# Patient Record
Sex: FEMALE | Race: BLACK OR AFRICAN AMERICAN | NOT HISPANIC OR LATINO | URBAN - METROPOLITAN AREA
[De-identification: names, ages, dates, MRNs, and addresses within clinical notes are randomized per-mention and may not be internally consistent; named-entity substitution may affect disease eponyms.]

---

## 2020-07-02 ENCOUNTER — TELEPHONE ENCOUNTER (OUTPATIENT)
Dept: URBAN - METROPOLITAN AREA CLINIC 9 | Facility: CLINIC | Age: 55
End: 2020-07-02

## 2020-07-02 ENCOUNTER — OFFICE VISIT (OUTPATIENT)
Age: 55
End: 2020-07-02

## 2020-07-07 ENCOUNTER — APPOINTMENT (RX ONLY)
Dept: URBAN - METROPOLITAN AREA CLINIC 37 | Facility: CLINIC | Age: 55
Setting detail: DERMATOLOGY
End: 2020-07-07

## 2020-07-07 ENCOUNTER — APPOINTMENT (RX ONLY)
Dept: URBAN - METROPOLITAN AREA CLINIC 38 | Facility: CLINIC | Age: 55
Setting detail: DERMATOLOGY
End: 2020-07-07

## 2020-07-07 DIAGNOSIS — D18.0 HEMANGIOMA: ICD-10-CM

## 2020-07-07 DIAGNOSIS — L81.4 OTHER MELANIN HYPERPIGMENTATION: ICD-10-CM

## 2020-07-07 DIAGNOSIS — L57.3 POIKILODERMA OF CIVATTE: ICD-10-CM

## 2020-07-07 DIAGNOSIS — D22 MELANOCYTIC NEVI: ICD-10-CM

## 2020-07-07 DIAGNOSIS — L82.1 OTHER SEBORRHEIC KERATOSIS: ICD-10-CM

## 2020-07-07 DIAGNOSIS — L91.8 OTHER HYPERTROPHIC DISORDERS OF THE SKIN: ICD-10-CM

## 2020-07-07 DIAGNOSIS — L72.0 EPIDERMAL CYST: ICD-10-CM

## 2020-07-07 PROBLEM — D23.71 OTHER BENIGN NEOPLASM OF SKIN OF RIGHT LOWER LIMB, INCLUDING HIP: Status: ACTIVE | Noted: 2020-07-07

## 2020-07-07 PROBLEM — D18.01 HEMANGIOMA OF SKIN AND SUBCUTANEOUS TISSUE: Status: ACTIVE | Noted: 2020-07-07

## 2020-07-07 PROBLEM — D23.61 OTHER BENIGN NEOPLASM OF SKIN OF RIGHT UPPER LIMB, INCLUDING SHOULDER: Status: ACTIVE | Noted: 2020-07-07

## 2020-07-07 PROBLEM — D22.39 MELANOCYTIC NEVI OF OTHER PARTS OF FACE: Status: ACTIVE | Noted: 2020-07-07

## 2020-07-07 PROBLEM — D23.5 OTHER BENIGN NEOPLASM OF SKIN OF TRUNK: Status: ACTIVE | Noted: 2020-07-07

## 2020-07-07 PROCEDURE — ? COUNSELING

## 2020-07-07 PROCEDURE — 99203 OFFICE O/P NEW LOW 30 MIN: CPT

## 2020-07-07 ASSESSMENT — LOCATION ZONE DERM
LOCATION ZONE: AXILLAE
LOCATION ZONE: TRUNK
LOCATION ZONE: FACE
LOCATION ZONE: NECK
LOCATION ZONE: FACE
LOCATION ZONE: AXILLAE
LOCATION ZONE: LEG
LOCATION ZONE: TRUNK
LOCATION ZONE: NECK
LOCATION ZONE: LEG

## 2020-07-07 ASSESSMENT — LOCATION SIMPLE DESCRIPTION DERM
LOCATION SIMPLE: RIGHT UPPER BACK
LOCATION SIMPLE: ABDOMEN
LOCATION SIMPLE: RIGHT AXILLARY VAULT
LOCATION SIMPLE: GROIN
LOCATION SIMPLE: LEFT AXILLARY VAULT
LOCATION SIMPLE: LEFT CHEEK
LOCATION SIMPLE: LEFT ANTERIOR NECK
LOCATION SIMPLE: GROIN
LOCATION SIMPLE: UPPER BACK
LOCATION SIMPLE: ABDOMEN
LOCATION SIMPLE: RIGHT ANTERIOR NECK
LOCATION SIMPLE: UPPER BACK
LOCATION SIMPLE: RIGHT THIGH
LOCATION SIMPLE: RIGHT ANTERIOR NECK
LOCATION SIMPLE: LEFT THIGH
LOCATION SIMPLE: LEFT AXILLARY VAULT
LOCATION SIMPLE: RIGHT THIGH
LOCATION SIMPLE: LEFT THIGH
LOCATION SIMPLE: RIGHT AXILLARY VAULT
LOCATION SIMPLE: RIGHT UPPER BACK
LOCATION SIMPLE: LEFT ANTERIOR NECK
LOCATION SIMPLE: LEFT CHEEK
LOCATION SIMPLE: RIGHT CHEEK
LOCATION SIMPLE: RIGHT CHEEK

## 2020-07-07 ASSESSMENT — LOCATION DETAILED DESCRIPTION DERM
LOCATION DETAILED: RIGHT LATERAL UPPER BACK
LOCATION DETAILED: LEFT INFERIOR LATERAL MALAR CHEEK
LOCATION DETAILED: LEFT RIB CAGE
LOCATION DETAILED: LEFT INFERIOR ANTERIOR NECK
LOCATION DETAILED: SUPERIOR THORACIC SPINE
LOCATION DETAILED: SUPERIOR THORACIC SPINE
LOCATION DETAILED: RIGHT RIB CAGE
LOCATION DETAILED: RIGHT LATERAL UPPER BACK
LOCATION DETAILED: LEFT INFERIOR LATERAL NECK
LOCATION DETAILED: LEFT ANTERIOR PROXIMAL THIGH
LOCATION DETAILED: RIGHT INFERIOR ANTERIOR NECK
LOCATION DETAILED: LEFT AXILLARY VAULT
LOCATION DETAILED: RIGHT SUPRAPUBIC SKIN
LOCATION DETAILED: RIGHT AXILLARY VAULT
LOCATION DETAILED: RIGHT SUPERIOR MEDIAL UPPER BACK
LOCATION DETAILED: RIGHT ANTERIOR PROXIMAL THIGH
LOCATION DETAILED: RIGHT INFERIOR CENTRAL MALAR CHEEK
LOCATION DETAILED: LEFT INFERIOR ANTERIOR NECK
LOCATION DETAILED: RIGHT SUPERIOR MEDIAL UPPER BACK
LOCATION DETAILED: LEFT ANTERIOR PROXIMAL THIGH
LOCATION DETAILED: RIGHT SUPRAPUBIC SKIN
LOCATION DETAILED: RIGHT AXILLARY VAULT
LOCATION DETAILED: RIGHT INFERIOR ANTERIOR NECK
LOCATION DETAILED: RIGHT INFERIOR LATERAL NECK
LOCATION DETAILED: RIGHT INFERIOR MEDIAL MALAR CHEEK
LOCATION DETAILED: RIGHT ANTERIOR PROXIMAL THIGH
LOCATION DETAILED: LEFT AXILLARY VAULT
LOCATION DETAILED: RIGHT INFERIOR MEDIAL MALAR CHEEK
LOCATION DETAILED: LEFT INFERIOR LATERAL NECK
LOCATION DETAILED: RIGHT INFERIOR CENTRAL MALAR CHEEK
LOCATION DETAILED: RIGHT INFERIOR LATERAL NECK
LOCATION DETAILED: LEFT RIB CAGE
LOCATION DETAILED: RIGHT RIB CAGE
LOCATION DETAILED: LEFT INFERIOR LATERAL MALAR CHEEK

## 2020-07-07 NOTE — PROCEDURE: COUNSELING
Detail Level: Simple
Detail Level: Zone
Detail Level: Detailed
Patient Specific Counseling (Will Not Stick From Patient To Patient): Requested that patient make another appointment for cosmetic removal of skin tags.
Patient Specific Counseling (Will Not Stick From Patient To Patient): Lesion on the right upper lateral back was treated with LN2 at n/c to the patient.
Detail Level: Generalized

## 2020-07-07 NOTE — HPI: EVALUATION OF SKIN LESION(S)
What Type Of Note Output Would You Prefer (Optional)?: Standard Output
How Severe Are Your Spot(S)?: mild
Have Your Spot(S) Been Treated In The Past?: has not been treated
Hpi Title: Evaluation of Skin Lesions
Additional History: Pt has a few skin tags under her axilla she is worried about.
Year Removed: 1900

## 2020-07-07 NOTE — PROCEDURE: COUNSELING
Detail Level: Generalized
Patient Specific Counseling (Will Not Stick From Patient To Patient): Lesion on the right upper lateral back was treated with LN2 at n/c to the patient.
Detail Level: Zone
Detail Level: Detailed
Patient Specific Counseling (Will Not Stick From Patient To Patient): Requested that patient make another appointment for cosmetic removal of skin tags.
Detail Level: Simple

## 2020-07-07 NOTE — HPI: EVALUATION OF SKIN LESION(S)
What Type Of Note Output Would You Prefer (Optional)?: Standard Output
Hpi Title: Evaluation of Skin Lesions
How Severe Are Your Spot(S)?: mild
Have Your Spot(S) Been Treated In The Past?: has not been treated
Year Removed: 1900
Additional History: Pt has a few skin tags under her axilla she is worried about.

## 2020-07-09 ENCOUNTER — OFFICE VISIT (OUTPATIENT)
Dept: URBAN - METROPOLITAN AREA CLINIC 9 | Facility: CLINIC | Age: 55
End: 2020-07-09

## 2020-07-30 ENCOUNTER — TELEPHONE ENCOUNTER (OUTPATIENT)
Dept: URBAN - METROPOLITAN AREA CLINIC 9 | Facility: CLINIC | Age: 55
End: 2020-07-30

## 2020-08-06 ENCOUNTER — APPOINTMENT (RX ONLY)
Dept: URBAN - METROPOLITAN AREA CLINIC 37 | Facility: CLINIC | Age: 55
Setting detail: DERMATOLOGY
End: 2020-08-06

## 2020-08-06 ENCOUNTER — APPOINTMENT (RX ONLY)
Dept: URBAN - METROPOLITAN AREA CLINIC 38 | Facility: CLINIC | Age: 55
Setting detail: DERMATOLOGY
End: 2020-08-06

## 2020-08-06 DIAGNOSIS — L91.8 OTHER HYPERTROPHIC DISORDERS OF THE SKIN: ICD-10-CM

## 2020-08-06 DIAGNOSIS — L82.1 OTHER SEBORRHEIC KERATOSIS: ICD-10-CM

## 2020-08-06 PROCEDURE — ? LIQUID NITROGEN (COSMETIC)

## 2020-08-06 PROCEDURE — ? SKIN TAG REMOVAL (COSMETIC)

## 2020-08-06 ASSESSMENT — LOCATION ZONE DERM
LOCATION ZONE: TRUNK
LOCATION ZONE: AXILLAE
LOCATION ZONE: NECK
LOCATION ZONE: NECK
LOCATION ZONE: TRUNK
LOCATION ZONE: AXILLAE

## 2020-08-06 ASSESSMENT — LOCATION SIMPLE DESCRIPTION DERM
LOCATION SIMPLE: RIGHT BREAST
LOCATION SIMPLE: ABDOMEN
LOCATION SIMPLE: LEFT AXILLARY VAULT
LOCATION SIMPLE: LEFT ANTERIOR NECK
LOCATION SIMPLE: LEFT ANTERIOR NECK
LOCATION SIMPLE: RIGHT BREAST
LOCATION SIMPLE: LEFT BREAST
LOCATION SIMPLE: RIGHT LOWER BACK
LOCATION SIMPLE: RIGHT AXILLARY VAULT
LOCATION SIMPLE: RIGHT ANTERIOR NECK
LOCATION SIMPLE: RIGHT LOWER BACK
LOCATION SIMPLE: LEFT BREAST
LOCATION SIMPLE: RIGHT ANTERIOR NECK
LOCATION SIMPLE: LEFT AXILLARY VAULT
LOCATION SIMPLE: ABDOMEN
LOCATION SIMPLE: RIGHT AXILLARY VAULT

## 2020-08-06 ASSESSMENT — LOCATION DETAILED DESCRIPTION DERM
LOCATION DETAILED: RIGHT AXILLARY VAULT
LOCATION DETAILED: RIGHT RIB CAGE
LOCATION DETAILED: RIGHT INFERIOR LATERAL MIDBACK
LOCATION DETAILED: RIGHT INFERIOR LATERAL NECK
LOCATION DETAILED: RIGHT RIB CAGE
LOCATION DETAILED: RIGHT CLAVICULAR NECK
LOCATION DETAILED: RIGHT AXILLARY VAULT
LOCATION DETAILED: LEFT INFERIOR LATERAL NECK
LOCATION DETAILED: LEFT CLAVICULAR NECK
LOCATION DETAILED: LEFT INFERIOR LATERAL NECK
LOCATION DETAILED: SUBXIPHOID
LOCATION DETAILED: LEFT AXILLARY VAULT
LOCATION DETAILED: LEFT INFRAMAMMARY CREASE (INNER QUADRANT)
LOCATION DETAILED: SUBXIPHOID
LOCATION DETAILED: RIGHT INFERIOR LATERAL MIDBACK
LOCATION DETAILED: LEFT AXILLARY VAULT
LOCATION DETAILED: LEFT CLAVICULAR NECK
LOCATION DETAILED: RIGHT CLAVICULAR NECK
LOCATION DETAILED: LEFT RIB CAGE
LOCATION DETAILED: RIGHT INFRAMAMMARY CREASE (INNER QUADRANT)
LOCATION DETAILED: RIGHT INFERIOR LATERAL NECK
LOCATION DETAILED: RIGHT INFRAMAMMARY CREASE (INNER QUADRANT)
LOCATION DETAILED: LEFT INFRAMAMMARY CREASE (INNER QUADRANT)
LOCATION DETAILED: LEFT RIB CAGE

## 2020-08-06 NOTE — PROCEDURE: LIQUID NITROGEN (COSMETIC)
Consent: The patient's consent was obtained including but not limited to risks of crusting, scabbing, blistering, scarring, darker or lighter pigmentary change, recurrence, incomplete removal and infection.
Render Post-Care Instructions In Note?: yes
Price (Use Numbers Only, No Special Characters Or $): 155
Post-Care Instructions: I reviewed with the patient in detail post-care instructions. Patient is to  avoid picking at any of the treated lesions. Pt may apply Vaseline to crusted or scabbing areas.
Billing Information: Bill by Static Price
Detail Level: Simple

## 2020-08-06 NOTE — PROCEDURE: LIQUID NITROGEN (COSMETIC)
Consent: The patient's consent was obtained including but not limited to risks of crusting, scabbing, blistering, scarring, darker or lighter pigmentary change, recurrence, incomplete removal and infection.
Render Post-Care Instructions In Note?: yes
Post-Care Instructions: I reviewed with the patient in detail post-care instructions. Patient is to  avoid picking at any of the treated lesions. Pt may apply Vaseline to crusted or scabbing areas.
Billing Information: Bill by Static Price
Detail Level: Simple
Price (Use Numbers Only, No Special Characters Or $): 155

## 2020-08-06 NOTE — PROCEDURE: SKIN TAG REMOVAL (COSMETIC)
Consent: Written consent obtained and the risks of skin tag removal was reviewed with the patient including but not limited to bleeding, pigmentary change, infection, pain, and remote possibility of scarring.
Removed With: scissors
Detail Level: Simple
Price (Use Numbers Only, No Special Characters Or $): 25
Anesthesia Volume In Cc: 5

## 2020-08-06 NOTE — PROCEDURE: SKIN TAG REMOVAL (COSMETIC)
Anesthesia Volume In Cc: 5
Detail Level: Simple
Removed With: scissors
Price (Use Numbers Only, No Special Characters Or $): 25
Consent: Written consent obtained and the risks of skin tag removal was reviewed with the patient including but not limited to bleeding, pigmentary change, infection, pain, and remote possibility of scarring.

## 2020-08-14 ENCOUNTER — OFFICE VISIT (OUTPATIENT)
Dept: URBAN - METROPOLITAN AREA SURGERY CENTER 9 | Facility: SURGERY CENTER | Age: 55
End: 2020-08-14

## 2020-09-01 ENCOUNTER — OFFICE VISIT (OUTPATIENT)
Age: 55
End: 2020-09-01

## 2020-09-02 ENCOUNTER — TELEPHONE ENCOUNTER (OUTPATIENT)
Dept: URBAN - METROPOLITAN AREA CLINIC 9 | Facility: CLINIC | Age: 55
End: 2020-09-02

## 2020-09-08 ENCOUNTER — OFFICE VISIT (OUTPATIENT)
Dept: URBAN - METROPOLITAN AREA SURGERY CENTER 9 | Facility: SURGERY CENTER | Age: 55
End: 2020-09-08

## 2020-10-29 ENCOUNTER — TELEPHONE ENCOUNTER (OUTPATIENT)
Dept: URBAN - METROPOLITAN AREA CLINIC 9 | Facility: CLINIC | Age: 55
End: 2020-10-29

## 2020-10-30 ENCOUNTER — OFFICE VISIT (OUTPATIENT)
Dept: URBAN - METROPOLITAN AREA SURGERY CENTER 9 | Facility: SURGERY CENTER | Age: 55
End: 2020-10-30

## 2020-11-12 ENCOUNTER — APPOINTMENT (RX ONLY)
Dept: URBAN - METROPOLITAN AREA CLINIC 38 | Facility: CLINIC | Age: 55
Setting detail: DERMATOLOGY
End: 2020-11-12

## 2020-11-12 ENCOUNTER — OFFICE VISIT (OUTPATIENT)
Dept: URBAN - METROPOLITAN AREA SURGERY CENTER 9 | Facility: SURGERY CENTER | Age: 55
End: 2020-11-12

## 2020-11-12 ENCOUNTER — APPOINTMENT (RX ONLY)
Dept: URBAN - METROPOLITAN AREA CLINIC 37 | Facility: CLINIC | Age: 55
Setting detail: DERMATOLOGY
End: 2020-11-12

## 2020-11-12 DIAGNOSIS — B07.8 OTHER VIRAL WARTS: ICD-10-CM

## 2020-11-12 PROBLEM — D48.5 NEOPLASM OF UNCERTAIN BEHAVIOR OF SKIN: Status: ACTIVE | Noted: 2020-11-12

## 2020-11-12 PROCEDURE — 99072 ADDL SUPL MATRL&STAF TM PHE: CPT

## 2020-11-12 PROCEDURE — ? PHE RELATED SUPPLIES PROVIDED/DISPENSED

## 2020-11-12 PROCEDURE — 11102 TANGNTL BX SKIN SINGLE LES: CPT

## 2020-11-12 PROCEDURE — ? BIOPSY BY SHAVE METHOD

## 2020-11-12 ASSESSMENT — LOCATION ZONE DERM
LOCATION ZONE: TRUNK
LOCATION ZONE: TRUNK

## 2020-11-12 ASSESSMENT — LOCATION DETAILED DESCRIPTION DERM
LOCATION DETAILED: RIGHT INFERIOR LATERAL UPPER BACK
LOCATION DETAILED: RIGHT INFERIOR LATERAL UPPER BACK

## 2020-11-12 ASSESSMENT — LOCATION SIMPLE DESCRIPTION DERM
LOCATION SIMPLE: RIGHT UPPER BACK
LOCATION SIMPLE: RIGHT UPPER BACK

## 2020-11-12 NOTE — HPI: EVALUATION OF SKIN LESION(S)
What Type Of Note Output Would You Prefer (Optional)?: Standard Output
How Severe Are Your Spot(S)?: moderate
Have Your Spot(S) Been Treated In The Past?: has been treated
Hpi Title: Evaluation of a Skin Lesion
Additional History: the area has been treated x 2 with cryotherapy and is still present.
Year Removed: 1900

## 2020-11-12 NOTE — PROCEDURE: MIPS QUALITY
Quality 110: Preventive Care And Screening: Influenza Immunization: Influenza Immunization not Administered for Documented Reasons.
Additional Notes: Pt declines influenza vaccination due to personal reasons
Detail Level: Detailed

## 2020-11-12 NOTE — PROCEDURE: BIOPSY BY SHAVE METHOD
Destruction After The Procedure: Yes
Electrodesiccation And Curettage Text: The wound bed was treated with electrodesiccation and curettage after the biopsy was performed.
Information: Selecting Yes will display possible errors in your note based on the variables you have selected. This validation is only offered as a suggestion for you. PLEASE NOTE THAT THE VALIDATION TEXT WILL BE REMOVED WHEN YOU FINALIZE YOUR NOTE. IF YOU WANT TO FAX A PRELIMINARY NOTE YOU WILL NEED TO TOGGLE THIS TO 'NO' IF YOU DO NOT WANT IT IN YOUR FAXED NOTE.
Hide Biopsy Depth?: No
Anesthesia Type: 1% lidocaine with epinephrine
Curettage Text: The wound bed was treated with curettage after the biopsy was performed.
Anesthesia Volume In Cc: 0.5
Billing Type: Third-Party Bill
Size Of Lesion In Cm: 0
Type Of Destruction Used: Curettage
Post-Care Instructions: I reviewed with the patient in detail post-care instructions. Patient is to keep the biopsy site dry overnight, and then apply vaseline twice daily until healed.
Biopsy Type: H and E
Consent: Written consent was obtained and risks were reviewed including but not limited to scarring, infection, bleeding, scabbing, incomplete removal,
Silver Nitrate Text: The wound bed was treated with silver nitrate after the biopsy was performed.
Biopsy Method: Brad ruff
Hemostasis: Electrocautery
Depth Of Biopsy: dermis
Cryotherapy Text: The wound bed was treated with cryotherapy after the biopsy was performed.
Dressing: bandage
Wound Care: Vaseline
Detail Level: Detailed
Electrodesiccation Text: The wound bed was treated with electrodesiccation after the biopsy was performed.
Notification Instructions: Patient will be notified of biopsy results. However, patient instructed to call the office if not contacted within 2 weeks.

## 2020-11-12 NOTE — HPI: EVALUATION OF SKIN LESION(S)
What Type Of Note Output Would You Prefer (Optional)?: Standard Output
Hpi Title: Evaluation of a Skin Lesion
How Severe Are Your Spot(S)?: moderate
Have Your Spot(S) Been Treated In The Past?: has been treated
Year Removed: 1900
Additional History: the area has been treated x 2 with cryotherapy and is still present.

## 2020-11-12 NOTE — PROCEDURE: BIOPSY BY SHAVE METHOD
Detail Level: Detailed
Depth Of Biopsy: dermis
Was A Bandage Applied: Yes
Size Of Lesion In Cm: 0
Biopsy Type: H and E
Biopsy Method: Jareth gutierrez
Anesthesia Type: 1% lidocaine with epinephrine
Anesthesia Volume In Cc: 0.5
Hemostasis: Electrocautery
Wound Care: Vaseline
Dressing: bandage
Type Of Destruction Used: Curettage
Curettage Text: The wound bed was treated with curettage after the biopsy was performed.
Cryotherapy Text: The wound bed was treated with cryotherapy after the biopsy was performed.
Electrodesiccation Text: The wound bed was treated with electrodesiccation after the biopsy was performed.
Electrodesiccation And Curettage Text: The wound bed was treated with electrodesiccation and curettage after the biopsy was performed.
Silver Nitrate Text: The wound bed was treated with silver nitrate after the biopsy was performed.
Lab: [id number]
Lab Facility: [id number]
Render Path Notes In Note?: No
Consent: Written consent was obtained and risks were reviewed including but not limited to scarring, infection, bleeding, scabbing, incomplete removal,
Post-Care Instructions: I reviewed with the patient in detail post-care instructions. Patient is to keep the biopsy site dry overnight, and then apply vaseline twice daily until healed.
Notification Instructions: Patient will be notified of biopsy results. However, patient instructed to call the office if not contacted within 2 weeks.
Billing Type: Third-Party Bill
Information: Selecting Yes will display possible errors in your note based on the variables you have selected. This validation is only offered as a suggestion for you. PLEASE NOTE THAT THE VALIDATION TEXT WILL BE REMOVED WHEN YOU FINALIZE YOUR NOTE. IF YOU WANT TO FAX A PRELIMINARY NOTE YOU WILL NEED TO TOGGLE THIS TO 'NO' IF YOU DO NOT WANT IT IN YOUR FAXED NOTE.

## 2022-07-09 ENCOUNTER — TELEPHONE ENCOUNTER (OUTPATIENT)
Dept: URBAN - METROPOLITAN AREA CLINIC 121 | Facility: CLINIC | Age: 57
End: 2022-07-09

## 2022-07-09 RX ORDER — FERROUS SULFATE 325(65) MG
TABLET ORAL TAKE AS DIRECTED
Refills: 0 | OUTPATIENT
Start: 2015-09-22 | End: 2015-09-22

## 2022-07-09 RX ORDER — ASPIRIN 325 MG/1
TABLET ORAL ONCE A DAY
Refills: 0 | OUTPATIENT
Start: 2010-02-02 | End: 2015-09-22

## 2022-07-09 RX ORDER — OMEGA-3S/DHA/EPA/FISH OIL 980-1400MG
CAPSULE,DELAYED RELEASE (ENTERIC COATED) ORAL ONCE A DAY
Refills: 0 | OUTPATIENT
Start: 2015-09-22 | End: 2015-09-22

## 2022-07-09 RX ORDER — TRAMADOL HYDROCHLORIDE 50 MG/1
TABLET ORAL TAKE AS DIRECTED
Refills: 0 | OUTPATIENT
Start: 2010-02-02 | End: 2015-09-22

## 2022-07-09 RX ORDER — OMEGA-3S/DHA/EPA/FISH OIL 980-1400MG
CAPSULE,DELAYED RELEASE (ENTERIC COATED) ORAL ONCE A DAY
Refills: 0 | OUTPATIENT
Start: 2010-02-02 | End: 2015-09-22

## 2022-07-09 RX ORDER — FERROUS SULFATE 325(65) MG
TABLET ORAL TAKE AS DIRECTED
Refills: 0 | OUTPATIENT
Start: 2010-02-02 | End: 2015-09-22

## 2022-07-10 ENCOUNTER — TELEPHONE ENCOUNTER (OUTPATIENT)
Dept: URBAN - METROPOLITAN AREA CLINIC 121 | Facility: CLINIC | Age: 57
End: 2022-07-10

## 2022-07-10 RX ORDER — OMEPRAZOLE 20 MG/1
ONCE A DAY CAPSULE, DELAYED RELEASE ORAL ONCE A DAY
Refills: 0 | Status: ACTIVE | COMMUNITY
Start: 2016-08-26

## 2022-07-10 RX ORDER — MELATONIN 10 MG
TABLET, SUBLINGUAL SUBLINGUAL ONCE A DAY
Refills: 0 | Status: ACTIVE | COMMUNITY
Start: 2015-09-22

## 2022-07-10 RX ORDER — IBUPROFEN 200 MG
TABLET ORAL
Refills: 0 | Status: ACTIVE | COMMUNITY
Start: 2015-09-22

## 2022-07-10 RX ORDER — TRAMADOL HYDROCHLORIDE 50 MG/1
TABLET ORAL TAKE AS DIRECTED
Refills: 0 | Status: ACTIVE | COMMUNITY
Start: 2015-09-22

## 2022-07-10 RX ORDER — DOCUSATE SODIUM 100 MG/1
CAPSULE ORAL
Refills: 0 | Status: ACTIVE | COMMUNITY
Start: 2015-09-22

## 2022-07-10 RX ORDER — GABAPENTIN 100 MG
CAPSULE ORAL
Refills: 0 | Status: ACTIVE | COMMUNITY
Start: 2015-09-22

## 2022-07-10 RX ORDER — ASPIRIN 325 MG/1
TABLET ORAL ONCE A DAY
Refills: 0 | Status: ACTIVE | COMMUNITY
Start: 2015-09-22

## 2022-07-10 RX ORDER — CITALOPRAM 10 MG/1
TABLET, FILM COATED ORAL ONCE A DAY
Refills: 0 | Status: ACTIVE | COMMUNITY
Start: 2015-09-22

## 2022-07-10 RX ORDER — ALPRAZOLAM 0.5 MG
TABLET ORAL
Refills: 0 | Status: ACTIVE | COMMUNITY
Start: 2015-09-22

## 2022-07-10 RX ORDER — OMEPRAZOLE 20 MG/1
ONCE A DAY CAPSULE, DELAYED RELEASE ORAL ONCE A DAY
Refills: 3 | Status: ACTIVE | COMMUNITY
Start: 2015-09-22

## 2022-07-10 RX ORDER — TRAMADOL HCL 50 MG
TABLET ORAL
Refills: 0 | Status: ACTIVE | COMMUNITY
Start: 2015-09-22

## 2022-07-10 RX ORDER — OMEPRAZOLE 20 MG/1
CAPSULE, DELAYED RELEASE ORAL ONCE A DAY
Refills: 3 | Status: ACTIVE | COMMUNITY
Start: 2008-06-20

## 2022-07-30 ENCOUNTER — TELEPHONE ENCOUNTER (OUTPATIENT)
Age: 57
End: 2022-07-30

## 2022-07-30 RX ORDER — WHEAT DEXTRIN 3 G/4 G
1 (ONE) POWDER (GRAM) ORAL DAILY
Qty: 0 | Refills: 2 | OUTPATIENT
Start: 2020-07-09 | End: 2020-08-08

## 2022-07-30 RX ORDER — FAMOTIDINE 20 MG/1
1 TABLET ORAL
Qty: 0 | Refills: 16 | OUTPATIENT
Start: 2020-07-09 | End: 2020-09-01

## 2022-07-31 ENCOUNTER — TELEPHONE ENCOUNTER (OUTPATIENT)
Age: 57
End: 2022-07-31

## 2022-07-31 RX ORDER — FAMOTIDINE 20 MG/1
1 TABLET ORAL
Qty: 0 | Refills: 16 | Status: ACTIVE | COMMUNITY
Start: 2020-07-09

## 2022-07-31 RX ORDER — WHEAT DEXTRIN 3 G/4 G
1 (ONE) POWDER (GRAM) ORAL DAILY
Qty: 0 | Refills: 2 | Status: ACTIVE | COMMUNITY
Start: 2020-07-09

## 2022-07-31 RX ORDER — LORATADINE 5 MG
17 GRAMS TABLET,CHEWABLE ORAL DAILY
Qty: 0 | Refills: 16 | Status: ACTIVE | COMMUNITY
Start: 2020-07-09

## 2022-11-18 ENCOUNTER — TELEPHONE ENCOUNTER (OUTPATIENT)
Dept: URBAN - METROPOLITAN AREA CLINIC 9 | Facility: CLINIC | Age: 57
End: 2022-11-18

## 2022-11-18 VITALS — BODY MASS INDEX: 36.32 KG/M2 | HEIGHT: 66 IN | WEIGHT: 226 LBS

## 2022-12-16 ENCOUNTER — WEB ENCOUNTER (OUTPATIENT)
Dept: URBAN - METROPOLITAN AREA CLINIC 9 | Facility: CLINIC | Age: 57
End: 2022-12-16

## 2022-12-16 ENCOUNTER — OFFICE VISIT (OUTPATIENT)
Dept: URBAN - METROPOLITAN AREA CLINIC 9 | Facility: CLINIC | Age: 57
End: 2022-12-16

## 2022-12-16 VITALS
HEIGHT: 66 IN | SYSTOLIC BLOOD PRESSURE: 118 MMHG | DIASTOLIC BLOOD PRESSURE: 60 MMHG | WEIGHT: 232 LBS | BODY MASS INDEX: 37.28 KG/M2

## 2022-12-16 DIAGNOSIS — K21.9 GASTROESOPHAGEAL REFLUX DISEASE WITHOUT ESOPHAGITIS: ICD-10-CM

## 2022-12-16 DIAGNOSIS — Z95.810 S/P ICD (INTERNAL CARDIAC DEFIBRILLATOR) PROCEDURE: ICD-10-CM

## 2022-12-16 DIAGNOSIS — T40.2X5A ADVERSE EFFECT OF OTHER OPIOIDS, INITIAL ENCOUNTER: ICD-10-CM

## 2022-12-16 DIAGNOSIS — I50.9 CONGESTIVE HEART FAILURE, UNSPECIFIED HF CHRONICITY, UNSPECIFIED HEART FAILURE TYPE: ICD-10-CM

## 2022-12-16 DIAGNOSIS — K58.1 IRRITABLE BOWEL SYNDROME WITH CONSTIPATION: ICD-10-CM

## 2022-12-16 DIAGNOSIS — K59.03 DRUG INDUCED CONSTIPATION: ICD-10-CM

## 2022-12-16 PROBLEM — 292045009: Status: ACTIVE | Noted: 2022-12-16

## 2022-12-16 PROBLEM — 161692001: Status: ACTIVE | Noted: 2022-12-16

## 2022-12-16 PROBLEM — 21782001 DRUG-INDUCED CONSTIPATION: Status: ACTIVE | Noted: 2022-12-16

## 2022-12-16 PROBLEM — 42343007: Status: ACTIVE | Noted: 2022-12-16

## 2022-12-16 PROCEDURE — 99214 OFFICE O/P EST MOD 30 MIN: CPT | Performed by: INTERNAL MEDICINE

## 2022-12-16 RX ORDER — ESCITALOPRAM 10 MG/1
TAKE 1 TABLET BY MOUTH EVERY DAY TABLET, FILM COATED ORAL
Qty: 90 EACH | Refills: 0 | Status: ACTIVE | COMMUNITY

## 2022-12-16 RX ORDER — PANTOPRAZOLE SODIUM 40 MG/1
1 TABLET TABLET, DELAYED RELEASE ORAL
Qty: 30 | Refills: 3 | OUTPATIENT

## 2022-12-16 RX ORDER — DOCUSATE SODIUM 100 MG/1
CAPSULE ORAL
Refills: 0 | Status: ON HOLD | COMMUNITY
Start: 2015-09-22

## 2022-12-16 RX ORDER — ALPRAZOLAM 0.5 MG
TABLET ORAL
Refills: 0 | Status: ON HOLD | COMMUNITY
Start: 2015-09-22

## 2022-12-16 RX ORDER — BUDESONIDE AND FORMOTEROL FUMARATE DIHYDRATE 160; 4.5 UG/1; UG/1
INHALE 2 PUFFS BY MOUTH TWICE DAILY AEROSOL RESPIRATORY (INHALATION)
Qty: 30.6 GRAM | Refills: 1 | Status: ACTIVE | COMMUNITY

## 2022-12-16 RX ORDER — GABAPENTIN 100 MG
CAPSULE ORAL
Refills: 0 | Status: ON HOLD | COMMUNITY
Start: 2015-09-22

## 2022-12-16 RX ORDER — HYDROCODONE BITARTRATE AND ACETAMINOPHEN 10; 325 MG/1; MG/1
TABLET ORAL
Qty: 90 TABLET | Refills: 0 | Status: ACTIVE | COMMUNITY

## 2022-12-16 RX ORDER — SPIRONOLACTONE 50 MG/1
TABLET ORAL
Qty: 90 TABLET | Refills: 1 | Status: ACTIVE | COMMUNITY

## 2022-12-16 RX ORDER — POTASSIUM CHLORIDE 10 MEQ/1
TAKE 1 TABLET BY MOUTH EVERY DAY TABLET, EXTENDED RELEASE ORAL
Qty: 30 EACH | Refills: 0 | Status: ACTIVE | COMMUNITY

## 2022-12-16 RX ORDER — WHEAT DEXTRIN 3 G/4 G
1 (ONE) POWDER (GRAM) ORAL DAILY
Qty: 0 | Refills: 2 | Status: ON HOLD | COMMUNITY
Start: 2020-07-09

## 2022-12-16 RX ORDER — NITROGLYCERIN 0.4 MG/1
TABLET SUBLINGUAL
Qty: 25 EACH | Refills: 0 | Status: ACTIVE | COMMUNITY

## 2022-12-16 RX ORDER — IVABRADINE 5 MG/1
TAKE 1 TABLET BY MOUTH TWICE DAILY TABLET, FILM COATED ORAL
Qty: 180 EACH | Refills: 0 | Status: ACTIVE | COMMUNITY

## 2022-12-16 RX ORDER — QUETIAPINE FUMARATE 400 MG/1
TABLET, FILM COATED ORAL
Qty: 45 TABLET | Refills: 6 | Status: ACTIVE | COMMUNITY

## 2022-12-16 RX ORDER — FAMOTIDINE 20 MG/1
1 TABLET ORAL
Qty: 0 | Refills: 16 | Status: ACTIVE | COMMUNITY
Start: 2020-07-09

## 2022-12-16 RX ORDER — OMEPRAZOLE 20 MG/1
CAPSULE, DELAYED RELEASE ORAL ONCE A DAY
Refills: 3 | Status: ON HOLD | COMMUNITY
Start: 2008-06-20

## 2022-12-16 RX ORDER — DIGOXIN 125 UG/1
TAKE 1 TABLET BY MOUTH EVERY DAY TABLET ORAL
Qty: 90 EACH | Refills: 1 | Status: ACTIVE | COMMUNITY

## 2022-12-16 RX ORDER — TRAMADOL HYDROCHLORIDE 50 MG/1
TABLET ORAL TAKE AS DIRECTED
Refills: 0 | Status: ON HOLD | COMMUNITY
Start: 2015-09-22

## 2022-12-16 RX ORDER — BUSPIRONE HYDROCHLORIDE 10 MG/1
TABLET ORAL
Qty: 90 TABLET | Refills: 2 | Status: ACTIVE | COMMUNITY

## 2022-12-16 RX ORDER — CITALOPRAM 10 MG/1
TABLET, FILM COATED ORAL ONCE A DAY
Refills: 0 | Status: ON HOLD | COMMUNITY
Start: 2015-09-22

## 2022-12-16 RX ORDER — ASPIRIN 325 MG/1
TABLET ORAL ONCE A DAY
Refills: 0 | Status: ON HOLD | COMMUNITY
Start: 2015-09-22

## 2022-12-16 RX ORDER — LORATADINE 5 MG
17 GRAMS TABLET,CHEWABLE ORAL DAILY
Qty: 0 | Refills: 16 | Status: ON HOLD | COMMUNITY
Start: 2020-07-09

## 2022-12-16 RX ORDER — SACUBITRIL AND VALSARTAN 24; 26 MG/1; MG/1
TABLET, FILM COATED ORAL
Qty: 180 TABLET | Refills: 2 | Status: ACTIVE | COMMUNITY

## 2022-12-16 RX ORDER — MELATONIN 10 MG
TABLET, SUBLINGUAL SUBLINGUAL ONCE A DAY
Refills: 0 | Status: ON HOLD | COMMUNITY
Start: 2015-09-22

## 2022-12-16 RX ORDER — IBUPROFEN 200 MG
TABLET ORAL
Refills: 0 | Status: ON HOLD | COMMUNITY
Start: 2015-09-22

## 2022-12-16 RX ORDER — NALOXEGOL OXALATE 25 MG/1
1 TABLET IN THE MORNING TABLET, FILM COATED ORAL ONCE A DAY
Qty: 30 | Refills: 2 | OUTPATIENT

## 2022-12-16 RX ORDER — ATORVASTATIN CALCIUM 20 MG/1
TAKE 1 TABLET BY MOUTH EVERY DAY TABLET, FILM COATED ORAL
Qty: 90 EACH | Refills: 1 | Status: ACTIVE | COMMUNITY

## 2022-12-16 RX ORDER — ACYCLOVIR 400 MG/1
TAKE 1 TABLET BY MOUTH TWICE DAILY FOR 10 DAYS TABLET ORAL
Qty: 20 EACH | Refills: 0 | Status: ACTIVE | COMMUNITY

## 2022-12-16 RX ORDER — TRAMADOL HCL 50 MG
TABLET ORAL
Refills: 0 | Status: ON HOLD | COMMUNITY
Start: 2015-09-22

## 2022-12-16 RX ORDER — METOPROLOL SUCCINATE 25 MG/1
TABLET, EXTENDED RELEASE ORAL
Qty: 90 TABLET | Refills: 1 | Status: ACTIVE | COMMUNITY

## 2022-12-16 RX ORDER — ALBUTEROL SULFATE 90 UG/1
INHALE 2 PUFFS BY MOUTH EVERY 6 HOURS AS NEEDED AEROSOL, METERED RESPIRATORY (INHALATION)
Qty: 20.1 GRAM | Refills: 0 | Status: ACTIVE | COMMUNITY

## 2022-12-16 RX ORDER — ISOSORBIDE DINITRATE 20 MG/1
TABLET ORAL
Qty: 90 TABLET | Refills: 1 | Status: ACTIVE | COMMUNITY

## 2022-12-16 RX ORDER — FAMOTIDINE 20 MG/1
1 TABLET ORAL
OUTPATIENT
Start: 2020-07-09

## 2022-12-16 RX ORDER — ONDANSETRON 8 MG/1
DISSOLVE 1 TABLET ON THE TONGUE TWICE DAILY AS NEEDED TABLET, ORALLY DISINTEGRATING ORAL
Qty: 60 EACH | Refills: 0 | Status: ACTIVE | COMMUNITY

## 2022-12-16 NOTE — HPI-TODAY'S VISIT:
Pt here for eval of gerd and also constipation. . colonoscopy 2013 with polyps colon 2020 clear, repeat in 2025 EGD advised but pt didn't take . Pt had responded to linzess but cannot afford . has not had labs or imaging but has the lab order . Pt here today for what sounds like persistent gerd sx despite h2 blocker therapy with some degree of dysphagia. She has never had an EGD I advised EGD for evaluation with HP bx and dilation if stricture noted. She also has constipation related to opioid therapy so will trial movantik 25 mg. RTC 6 weeks. .

## 2022-12-19 ENCOUNTER — TELEPHONE ENCOUNTER (OUTPATIENT)
Dept: URBAN - METROPOLITAN AREA CLINIC 7 | Facility: CLINIC | Age: 57
End: 2022-12-19

## 2023-01-12 ENCOUNTER — TELEPHONE ENCOUNTER (OUTPATIENT)
Dept: URBAN - METROPOLITAN AREA CLINIC 9 | Facility: CLINIC | Age: 58
End: 2023-01-12

## 2023-01-17 ENCOUNTER — OFFICE VISIT (OUTPATIENT)
Dept: URBAN - METROPOLITAN AREA SURGERY CENTER 9 | Facility: SURGERY CENTER | Age: 58
End: 2023-01-17

## 2023-02-08 ENCOUNTER — TELEPHONE ENCOUNTER (OUTPATIENT)
Dept: URBAN - METROPOLITAN AREA CLINIC 9 | Facility: CLINIC | Age: 58
End: 2023-02-08

## 2023-02-16 ENCOUNTER — OFFICE VISIT (OUTPATIENT)
Dept: URBAN - METROPOLITAN AREA SURGERY CENTER 9 | Facility: SURGERY CENTER | Age: 58
End: 2023-02-16

## 2023-05-19 ENCOUNTER — TELEPHONE ENCOUNTER (OUTPATIENT)
Dept: URBAN - METROPOLITAN AREA CLINIC 9 | Facility: CLINIC | Age: 58
End: 2023-05-19

## 2023-05-19 RX ORDER — NALOXEGOL OXALATE 25 MG/1
1 TABLET IN THE MORNING TABLET, FILM COATED ORAL ONCE A DAY
Qty: 30 | Refills: 3

## 2023-05-22 ENCOUNTER — OFFICE VISIT (OUTPATIENT)
Dept: URBAN - METROPOLITAN AREA SURGERY CENTER 9 | Facility: SURGERY CENTER | Age: 58
End: 2023-05-22

## 2023-06-09 ENCOUNTER — APPOINTMENT (RX ONLY)
Dept: URBAN - METROPOLITAN AREA CLINIC 41 | Facility: CLINIC | Age: 58
Setting detail: DERMATOLOGY
End: 2023-06-09

## 2023-06-09 DIAGNOSIS — L82.1 OTHER SEBORRHEIC KERATOSIS: ICD-10-CM

## 2023-06-09 DIAGNOSIS — L81.4 OTHER MELANIN HYPERPIGMENTATION: ICD-10-CM

## 2023-06-09 DIAGNOSIS — L91.8 OTHER HYPERTROPHIC DISORDERS OF THE SKIN: ICD-10-CM

## 2023-06-09 DIAGNOSIS — D22 MELANOCYTIC NEVI: ICD-10-CM

## 2023-06-09 DIAGNOSIS — D18.0 HEMANGIOMA: ICD-10-CM

## 2023-06-09 DIAGNOSIS — L72.0 EPIDERMAL CYST: ICD-10-CM

## 2023-06-09 PROBLEM — D23.39 OTHER BENIGN NEOPLASM OF SKIN OF OTHER PARTS OF FACE: Status: ACTIVE | Noted: 2023-06-09

## 2023-06-09 PROBLEM — D22.62 MELANOCYTIC NEVI OF LEFT UPPER LIMB, INCLUDING SHOULDER: Status: ACTIVE | Noted: 2023-06-09

## 2023-06-09 PROBLEM — D22.39 MELANOCYTIC NEVI OF OTHER PARTS OF FACE: Status: ACTIVE | Noted: 2023-06-09

## 2023-06-09 PROBLEM — D18.01 HEMANGIOMA OF SKIN AND SUBCUTANEOUS TISSUE: Status: ACTIVE | Noted: 2023-06-09

## 2023-06-09 PROBLEM — D23.71 OTHER BENIGN NEOPLASM OF SKIN OF RIGHT LOWER LIMB, INCLUDING HIP: Status: ACTIVE | Noted: 2023-06-09

## 2023-06-09 PROCEDURE — ? COUNSELING

## 2023-06-09 PROCEDURE — 99212 OFFICE O/P EST SF 10 MIN: CPT

## 2023-06-09 ASSESSMENT — LOCATION ZONE DERM
LOCATION ZONE: NECK
LOCATION ZONE: LEG
LOCATION ZONE: FACE
LOCATION ZONE: TRUNK
LOCATION ZONE: ARM

## 2023-06-09 ASSESSMENT — LOCATION DETAILED DESCRIPTION DERM
LOCATION DETAILED: RIGHT INFERIOR MEDIAL UPPER BACK
LOCATION DETAILED: SUBXIPHOID
LOCATION DETAILED: RIGHT ANTERIOR DISTAL THIGH
LOCATION DETAILED: INFERIOR THORACIC SPINE
LOCATION DETAILED: RIGHT SUPERIOR NASAL CHEEK
LOCATION DETAILED: RIGHT CHIN
LOCATION DETAILED: RIGHT DISTAL POSTERIOR UPPER ARM
LOCATION DETAILED: SUPERIOR THORACIC SPINE
LOCATION DETAILED: RIGHT INFERIOR ANTERIOR NECK
LOCATION DETAILED: LEFT DISTAL POSTERIOR UPPER ARM
LOCATION DETAILED: LEFT INFRAMAMMARY CREASE
LOCATION DETAILED: MIDDLE STERNUM

## 2023-06-09 ASSESSMENT — LOCATION SIMPLE DESCRIPTION DERM
LOCATION SIMPLE: CHIN
LOCATION SIMPLE: RIGHT CHEEK
LOCATION SIMPLE: RIGHT POSTERIOR UPPER ARM
LOCATION SIMPLE: LEFT POSTERIOR UPPER ARM
LOCATION SIMPLE: ABDOMEN
LOCATION SIMPLE: UPPER BACK
LOCATION SIMPLE: RIGHT ANTERIOR NECK
LOCATION SIMPLE: CHEST
LOCATION SIMPLE: RIGHT UPPER BACK
LOCATION SIMPLE: RIGHT THIGH

## 2023-11-09 ENCOUNTER — OFFICE VISIT (OUTPATIENT)
Dept: URBAN - METROPOLITAN AREA CLINIC 9 | Facility: CLINIC | Age: 58
End: 2023-11-09

## 2024-04-30 ENCOUNTER — OV EP (OUTPATIENT)
Dept: URBAN - METROPOLITAN AREA CLINIC 9 | Facility: CLINIC | Age: 59
End: 2024-04-30

## 2024-04-30 VITALS
WEIGHT: 246 LBS | SYSTOLIC BLOOD PRESSURE: 100 MMHG | BODY MASS INDEX: 39.53 KG/M2 | DIASTOLIC BLOOD PRESSURE: 64 MMHG | HEIGHT: 66 IN

## 2024-04-30 RX ORDER — SACUBITRIL AND VALSARTAN 24; 26 MG/1; MG/1
1 TABLET TABLET, FILM COATED ORAL TWICE A DAY
Qty: 180 TABLET | Refills: 2 | Status: ACTIVE | COMMUNITY

## 2024-04-30 RX ORDER — ATORVASTATIN CALCIUM 20 MG/1
TAKE 1 TABLET BY MOUTH EVERY DAY TABLET, FILM COATED ORAL
Qty: 90 EACH | Refills: 1 | Status: ACTIVE | COMMUNITY

## 2024-04-30 RX ORDER — TRAMADOL HYDROCHLORIDE 50 MG/1
TABLET ORAL TAKE AS DIRECTED
Refills: 0 | Status: ON HOLD | COMMUNITY
Start: 2015-09-22

## 2024-04-30 RX ORDER — LORAZEPAM 0.5 MG/1
1 TABLET TABLET ORAL
Status: ACTIVE | COMMUNITY

## 2024-04-30 RX ORDER — BUSPIRONE HYDROCHLORIDE 10 MG/1
TABLET ORAL
Qty: 90 TABLET | Refills: 2 | Status: DISCONTINUED | COMMUNITY

## 2024-04-30 RX ORDER — ONDANSETRON 8 MG/1
DISSOLVE 1 TABLET ON THE TONGUE TWICE DAILY AS NEEDED TABLET, ORALLY DISINTEGRATING ORAL
Qty: 60 EACH | Refills: 0 | Status: ACTIVE | COMMUNITY

## 2024-04-30 RX ORDER — NITROGLYCERIN 0.4 MG/1
TABLET SUBLINGUAL
Qty: 25 EACH | Refills: 0 | Status: ACTIVE | COMMUNITY

## 2024-04-30 RX ORDER — ASPIRIN 325 MG/1
TABLET ORAL ONCE A DAY
Refills: 0 | Status: ON HOLD | COMMUNITY
Start: 2015-09-22

## 2024-04-30 RX ORDER — IBUPROFEN 200 MG
TABLET ORAL
Refills: 0 | Status: ON HOLD | COMMUNITY
Start: 2015-09-22

## 2024-04-30 RX ORDER — QUETIAPINE FUMARATE 400 MG/1
1 TABLET IN AM AND 1 TABLET IN PM TABLET, FILM COATED ORAL TWICE A DAY
Refills: 6 | Status: ACTIVE | COMMUNITY

## 2024-04-30 RX ORDER — EMPAGLIFLOZIN 25 MG/1
1 TABLET TABLET, FILM COATED ORAL ONCE A DAY
Status: ACTIVE | COMMUNITY

## 2024-04-30 RX ORDER — BUDESONIDE AND FORMOTEROL FUMARATE DIHYDRATE 160; 4.5 UG/1; UG/1
INHALE 2 PUFFS BY MOUTH TWICE DAILY AEROSOL RESPIRATORY (INHALATION)
Qty: 30.6 GRAM | Refills: 1 | Status: ACTIVE | COMMUNITY

## 2024-04-30 RX ORDER — METOPROLOL SUCCINATE 25 MG/1
1 TABLET TABLET, EXTENDED RELEASE ORAL ONCE A DAY
Qty: 90 TABLET | Refills: 1 | Status: ACTIVE | COMMUNITY

## 2024-04-30 RX ORDER — METHOCARBAMOL 750 MG/1
1 TABLET TABLET ORAL
Status: ACTIVE | COMMUNITY

## 2024-04-30 RX ORDER — PANTOPRAZOLE SODIUM 40 MG/1
1 TABLET TABLET, DELAYED RELEASE ORAL TWICE A DAY
Qty: 60 TABLET | Refills: 3 | Status: ACTIVE | COMMUNITY

## 2024-04-30 RX ORDER — CITALOPRAM 10 MG/1
TABLET, FILM COATED ORAL ONCE A DAY
Refills: 0 | Status: ON HOLD | COMMUNITY
Start: 2015-09-22

## 2024-04-30 RX ORDER — GABAPENTIN 100 MG
CAPSULE ORAL
Refills: 0 | Status: ON HOLD | COMMUNITY
Start: 2015-09-22

## 2024-04-30 RX ORDER — LUBIPROSTONE 24 UG/1
1 CAPSULE WITH FOOD AND WATER CAPSULE, GELATIN COATED ORAL TWICE A DAY
Qty: 60 | Refills: 11 | OUTPATIENT
Start: 2024-04-30 | End: 2025-04-25

## 2024-04-30 RX ORDER — MIRTAZAPINE 15 MG/1
1 TABLET AT BEDTIME TABLET, FILM COATED ORAL ONCE A DAY
Status: ACTIVE | COMMUNITY

## 2024-04-30 RX ORDER — POTASSIUM CHLORIDE 10 MEQ/1
TAKE 1 TABLET BY MOUTH EVERY DAY TABLET, EXTENDED RELEASE ORAL TWICE A DAY
Refills: 0 | Status: ACTIVE | COMMUNITY

## 2024-04-30 RX ORDER — MELATONIN 10 MG
TABLET, SUBLINGUAL SUBLINGUAL ONCE A DAY
Refills: 0 | Status: ON HOLD | COMMUNITY
Start: 2015-09-22

## 2024-04-30 RX ORDER — OMEPRAZOLE 20 MG/1
CAPSULE, DELAYED RELEASE ORAL ONCE A DAY
Refills: 3 | Status: ON HOLD | COMMUNITY
Start: 2008-06-20

## 2024-04-30 RX ORDER — ACYCLOVIR 400 MG/1
TAKE 1 TABLET TABLET ORAL ONCE A DAY
Refills: 0 | Status: ACTIVE | COMMUNITY

## 2024-04-30 RX ORDER — HYDROCODONE BITARTRATE AND ACETAMINOPHEN 10; 325 MG/1; MG/1
1 TABLET AS NEEDED TABLET ORAL THREE TIMES A DAY
Qty: 90 TABLET | Refills: 0 | Status: ACTIVE | COMMUNITY

## 2024-04-30 RX ORDER — DOCUSATE SODIUM 100 MG/1
CAPSULE ORAL
Refills: 0 | Status: ON HOLD | COMMUNITY
Start: 2015-09-22

## 2024-04-30 RX ORDER — NALOXEGOL OXALATE 25 MG/1
1 TABLET IN THE MORNING TABLET, FILM COATED ORAL ONCE A DAY
OUTPATIENT

## 2024-04-30 RX ORDER — ISOSORBIDE DINITRATE 20 MG/1
1 TABLET TABLET ORAL ONCE A DAY
Qty: 90 TABLET | Refills: 1 | Status: ACTIVE | COMMUNITY

## 2024-04-30 RX ORDER — WHEAT DEXTRIN 3 G/4 G
1 (ONE) POWDER (GRAM) ORAL DAILY
Qty: 0 | Refills: 2 | Status: ON HOLD | COMMUNITY
Start: 2020-07-09

## 2024-04-30 RX ORDER — IVABRADINE 5 MG/1
TAKE 1 TABLET BY MOUTH TWICE DAILY TABLET, FILM COATED ORAL
Qty: 180 EACH | Refills: 0 | Status: ON HOLD | COMMUNITY

## 2024-04-30 RX ORDER — PANTOPRAZOLE SODIUM 40 MG/1
1 TABLET TABLET, DELAYED RELEASE ORAL TWICE A DAY
OUTPATIENT

## 2024-04-30 RX ORDER — ESCITALOPRAM 10 MG/1
TAKE 1 TABLET BY MOUTH EVERY DAY TABLET, FILM COATED ORAL
Qty: 90 EACH | Refills: 0 | Status: ACTIVE | COMMUNITY

## 2024-04-30 RX ORDER — ALPRAZOLAM 0.5 MG
TABLET ORAL
Refills: 0 | Status: ON HOLD | COMMUNITY
Start: 2015-09-22

## 2024-04-30 RX ORDER — NALOXEGOL OXALATE 25 MG/1
1 TABLET IN THE MORNING TABLET, FILM COATED ORAL ONCE A DAY
Qty: 30 | Refills: 3 | Status: ACTIVE | COMMUNITY

## 2024-04-30 RX ORDER — LORATADINE 5 MG
17 GRAMS TABLET,CHEWABLE ORAL DAILY
Qty: 0 | Refills: 16 | Status: ON HOLD | COMMUNITY
Start: 2020-07-09

## 2024-04-30 RX ORDER — SPIRONOLACTONE 50 MG/1
1 TABLET TABLET ORAL ONCE A DAY
Qty: 90 TABLET | Refills: 1 | Status: ACTIVE | COMMUNITY

## 2024-04-30 RX ORDER — ALBUTEROL SULFATE 90 UG/1
INHALE 2 PUFFS BY MOUTH EVERY 6 HOURS AS NEEDED AEROSOL, METERED RESPIRATORY (INHALATION)
Qty: 20.1 GRAM | Refills: 0 | Status: ACTIVE | COMMUNITY

## 2024-04-30 RX ORDER — TRAMADOL HCL 50 MG
TABLET ORAL
Refills: 0 | Status: ON HOLD | COMMUNITY
Start: 2015-09-22

## 2024-04-30 RX ORDER — DIGOXIN 125 UG/1
TAKE 1 TABLET BY MOUTH EVERY DAY TABLET ORAL
Qty: 90 EACH | Refills: 1 | Status: ACTIVE | COMMUNITY

## 2024-04-30 NOTE — HPI-TODAY'S VISIT:
Pt here for f/u eval of gerd and also constipation. . colonoscopy 2013 with polyps colon 2020 clear, repeat in 2025 EGD advised but pt didn't take . Pt had responded to linzess but cannot afford I had given her movantik but she doesn 't know if she took She has not tried amitiza . I had advised EGD but she never scheduled due to insurance. She has continued on ppi and still has constipation but does move them daily but with several dulcolax. I advised we cannot use continued high dose dulcolax. I advised we need to try amitiza as that may be more cost effective. She needs a hospital based EGD and we will arrange. RTC 6 mo. .

## 2024-06-17 ENCOUNTER — TELEPHONE ENCOUNTER (OUTPATIENT)
Dept: URBAN - METROPOLITAN AREA CLINIC 9 | Facility: CLINIC | Age: 59
End: 2024-06-17

## 2024-06-17 RX ORDER — LUBIPROSTONE 24 UG/1
1 CAPSULE WITH FOOD AND WATER CAPSULE, GELATIN COATED ORAL TWICE A DAY
Qty: 60 | Refills: 11
Start: 2024-04-30 | End: 2025-06-12

## 2024-08-13 ENCOUNTER — OFFICE VISIT (OUTPATIENT)
Dept: URBAN - METROPOLITAN AREA CLINIC 9 | Facility: CLINIC | Age: 59
End: 2024-08-13

## 2024-10-24 ENCOUNTER — OFFICE VISIT (OUTPATIENT)
Dept: URBAN - METROPOLITAN AREA CLINIC 9 | Facility: CLINIC | Age: 59
End: 2024-10-24

## 2024-12-04 ENCOUNTER — OFFICE VISIT (OUTPATIENT)
Dept: URBAN - METROPOLITAN AREA CLINIC 9 | Facility: CLINIC | Age: 59
End: 2024-12-04

## 2024-12-20 ENCOUNTER — P2P PATIENT RECORD (OUTPATIENT)
Age: 59
End: 2024-12-20

## 2025-04-17 ENCOUNTER — APPOINTMENT (OUTPATIENT)
Dept: URBAN - METROPOLITAN AREA CLINIC 41 | Facility: CLINIC | Age: 60
Setting detail: DERMATOLOGY
End: 2025-04-17

## 2025-04-17 DIAGNOSIS — L91.8 OTHER HYPERTROPHIC DISORDERS OF THE SKIN: ICD-10-CM

## 2025-04-17 PROBLEM — D23.39 OTHER BENIGN NEOPLASM OF SKIN OF OTHER PARTS OF FACE: Status: ACTIVE | Noted: 2025-04-17

## 2025-04-17 PROCEDURE — ? COUNSELING

## 2025-04-17 PROCEDURE — 99212 OFFICE O/P EST SF 10 MIN: CPT

## 2025-04-17 PROCEDURE — ? ADDITIONAL NOTES

## 2025-04-17 ASSESSMENT — LOCATION SIMPLE DESCRIPTION DERM
LOCATION SIMPLE: ABDOMEN
LOCATION SIMPLE: RIGHT ANTERIOR NECK
LOCATION SIMPLE: LEFT ANTERIOR NECK

## 2025-04-17 ASSESSMENT — LOCATION DETAILED DESCRIPTION DERM
LOCATION DETAILED: LEFT RIB CAGE
LOCATION DETAILED: RIGHT INFERIOR LATERAL NECK
LOCATION DETAILED: LEFT INFERIOR LATERAL NECK

## 2025-04-17 ASSESSMENT — LOCATION ZONE DERM
LOCATION ZONE: NECK
LOCATION ZONE: TRUNK

## 2025-04-17 NOTE — PROCEDURE: ADDITIONAL NOTES
Detail Level: Simple
Render Risk Assessment In Note?: yes
Additional Notes: Estimated $210 for removal of skin tags.

## 2025-05-01 ENCOUNTER — APPOINTMENT (OUTPATIENT)
Dept: URBAN - METROPOLITAN AREA CLINIC 41 | Facility: CLINIC | Age: 60
Setting detail: DERMATOLOGY
End: 2025-05-01

## 2025-05-01 DIAGNOSIS — L91.8 OTHER HYPERTROPHIC DISORDERS OF THE SKIN: ICD-10-CM

## 2025-05-01 PROCEDURE — ? INVENTORY

## 2025-05-01 PROCEDURE — ? SKIN TAG REMOVAL (COSMETIC)

## 2025-05-01 ASSESSMENT — LOCATION DETAILED DESCRIPTION DERM
LOCATION DETAILED: LEFT INFRAMAMMARY CREASE (INNER QUADRANT)
LOCATION DETAILED: LEFT MEDIAL TRAPEZIAL NECK
LOCATION DETAILED: LEFT LATERAL TRAPEZIAL NECK
LOCATION DETAILED: EPIGASTRIC SKIN
LOCATION DETAILED: LEFT INFERIOR POSTERIOR NECK
LOCATION DETAILED: LEFT INFERIOR LATERAL NECK
LOCATION DETAILED: LEFT SUPERIOR LATERAL NECK
LOCATION DETAILED: RIGHT INFERIOR LATERAL NECK

## 2025-05-01 ASSESSMENT — LOCATION SIMPLE DESCRIPTION DERM
LOCATION SIMPLE: RIGHT ANTERIOR NECK
LOCATION SIMPLE: LEFT ANTERIOR NECK
LOCATION SIMPLE: POSTERIOR NECK
LOCATION SIMPLE: ABDOMEN
LOCATION SIMPLE: LEFT BREAST

## 2025-05-01 ASSESSMENT — LOCATION ZONE DERM
LOCATION ZONE: NECK
LOCATION ZONE: TRUNK

## 2025-05-01 NOTE — PROCEDURE: SKIN TAG REMOVAL (COSMETIC)
Anesthesia Type: 1% lidocaine with epinephrine
Consent: Written consent obtained and the risks of skin tag removal was reviewed with the patient including but not limited to bleeding, pigmentary change, infection, pain, and remote possibility of scarring.
Anesthesia Volume In Cc: 0.5
Removed With: liquid nitrogen
Detail Level: Detailed
Hemostasis: Electrocautery

## 2025-05-03 ENCOUNTER — P2P PATIENT RECORD (OUTPATIENT)
Age: 60
End: 2025-05-03

## 2025-07-17 ENCOUNTER — P2P PATIENT RECORD (OUTPATIENT)
Age: 60
End: 2025-07-17

## 2025-08-06 ENCOUNTER — APPOINTMENT (OUTPATIENT)
Dept: URBAN - METROPOLITAN AREA CLINIC 41 | Facility: CLINIC | Age: 60
Setting detail: DERMATOLOGY
End: 2025-08-06

## 2025-08-06 DIAGNOSIS — L91.8 OTHER HYPERTROPHIC DISORDERS OF THE SKIN: ICD-10-CM

## 2025-08-06 PROCEDURE — ? COUNSELING

## 2025-08-06 ASSESSMENT — LOCATION DETAILED DESCRIPTION DERM
LOCATION DETAILED: SUBXIPHOID
LOCATION DETAILED: LEFT RIB CAGE

## 2025-08-06 ASSESSMENT — LOCATION SIMPLE DESCRIPTION DERM: LOCATION SIMPLE: ABDOMEN

## 2025-08-06 ASSESSMENT — LOCATION ZONE DERM: LOCATION ZONE: TRUNK
